# Patient Record
Sex: FEMALE | Race: BLACK OR AFRICAN AMERICAN | NOT HISPANIC OR LATINO | URBAN - METROPOLITAN AREA
[De-identification: names, ages, dates, MRNs, and addresses within clinical notes are randomized per-mention and may not be internally consistent; named-entity substitution may affect disease eponyms.]

---

## 2022-01-31 ENCOUNTER — EMERGENCY (EMERGENCY)
Facility: HOSPITAL | Age: 42
LOS: 1 days | Discharge: HOME | End: 2022-01-31
Attending: EMERGENCY MEDICINE | Admitting: EMERGENCY MEDICINE
Payer: COMMERCIAL

## 2022-01-31 VITALS
HEART RATE: 78 BPM | SYSTOLIC BLOOD PRESSURE: 116 MMHG | DIASTOLIC BLOOD PRESSURE: 75 MMHG | TEMPERATURE: 98 F | OXYGEN SATURATION: 100 % | RESPIRATION RATE: 18 BRPM

## 2022-01-31 PROCEDURE — 99283 EMERGENCY DEPT VISIT LOW MDM: CPT

## 2022-01-31 NOTE — ED ADULT TRIAGE NOTE - CHIEF COMPLAINT QUOTE
Pt reporting to the ED for nose injury. Pt reports shelf fell and hit pt on bridge of her nose. No LOC, anticoagulation use. No bleeding noted.

## 2022-02-01 RX ORDER — IBUPROFEN 200 MG
600 TABLET ORAL ONCE
Refills: 0 | Status: COMPLETED | OUTPATIENT
Start: 2022-02-01 | End: 2022-02-01

## 2022-02-01 RX ORDER — IBUPROFEN 200 MG
1 TABLET ORAL
Qty: 15 | Refills: 0
Start: 2022-02-01 | End: 2022-02-05

## 2022-02-01 RX ORDER — OXYCODONE HYDROCHLORIDE 5 MG/1
5 TABLET ORAL ONCE
Refills: 0 | Status: DISCONTINUED | OUTPATIENT
Start: 2022-02-01 | End: 2022-02-01

## 2022-02-01 RX ORDER — LIDOCAINE 4 G/100G
1 CREAM TOPICAL ONCE
Refills: 0 | Status: COMPLETED | OUTPATIENT
Start: 2022-02-01 | End: 2022-02-01

## 2022-02-01 RX ORDER — OXYCODONE HYDROCHLORIDE 5 MG/1
1 TABLET ORAL
Qty: 6 | Refills: 0
Start: 2022-02-01 | End: 2022-02-02

## 2022-02-01 RX ADMIN — Medication 600 MILLIGRAM(S): at 02:45

## 2022-02-01 RX ADMIN — LIDOCAINE 1 PATCH: 4 CREAM TOPICAL at 02:45

## 2022-02-01 RX ADMIN — OXYCODONE HYDROCHLORIDE 5 MILLIGRAM(S): 5 TABLET ORAL at 02:47

## 2022-02-01 NOTE — ED PROVIDER NOTE - ENMT, MLM
Airway patent, Nasal mucosa clear. Mouth with normal mucosa. Throat has no vesicles, no oropharyngeal exudates and uvula is midline. Breathing through bilateral nares. No septal hematoma. EOMI. Obvious deformity and swelling to bridge of nose.

## 2022-02-01 NOTE — ED PROVIDER NOTE - NSFOLLOWUPINSTRUCTIONS_ED_ALL_ED_FT
-You will be contacted by a hospital representative to help set up an appointment with an plastic surgeon during business hours. You may also use the patient access number above to set up your own appointment.    -Take Ibuprofen as prescribed. Take this medication with food.    -Take Oxycodone as prescribed. Do not drink alcohol or drive while taking this medication as it can make you drowsy. You may also need to take a stool softener or increase your fiber consumption as this medication can cause constipation.     -You may also take Extra Strength Tylenol over the counter per label instructions as needed for pain along with the above medications.    -Apply ice pack to nasal bridge 5-10 minutes up to 3 times per day to reduce swelling.

## 2022-02-01 NOTE — ED PROVIDER NOTE - ATTENDING CONTRIBUTION TO CARE
Afebrile. Awake and Alert. Head atraumatic. No mid-line CS TTP. +TTP upper nose no septal hematoma. Lungs CTA. Heart RRR. Abdomen soft NTND. CN II-XII grossly intact. Moves all extremities without lateralization.    f/u plastic within weak  Supportive care

## 2022-02-01 NOTE — ED PROVIDER NOTE - PATIENT PORTAL LINK FT
You can access the FollowMyHealth Patient Portal offered by [facility] by registering at the following website: [email or web address]. By joining [facility]’s FollowMyHealth portal, you will also be able to view your health information using other applications (apps) compatible with our system.

## 2022-02-01 NOTE — ED PROVIDER NOTE - CLINICAL SUMMARY MEDICAL DECISION MAKING FREE TEXT BOX
Gregory - traumatic injury to nose. no septal hematoma. + breathing through both nares. no signs of entrapment. No VA deficit. will pain control and dc with urgent ENT follow up for suspected nasal bone fracture.

## 2022-02-01 NOTE — ED PROVIDER NOTE - OBJECTIVE STATEMENT
41F no PMH presents to ED c/o pain to bridge of nose. Patient was working on shelf when it detached from the wall, striking her in the nose. She is having left sided neck pain as well. No headache. No back pain. No chest pain. No eye pain or blurry/double vision. No other injuries. Not on blood thinners.

## 2022-02-04 ENCOUNTER — LABORATORY RESULT (OUTPATIENT)
Age: 42
End: 2022-02-04

## 2022-02-04 ENCOUNTER — APPOINTMENT (OUTPATIENT)
Dept: OTOLARYNGOLOGY | Facility: CLINIC | Age: 42
End: 2022-02-04
Payer: COMMERCIAL

## 2022-02-04 VITALS
HEART RATE: 75 BPM | BODY MASS INDEX: 27.66 KG/M2 | TEMPERATURE: 97.2 F | WEIGHT: 162 LBS | DIASTOLIC BLOOD PRESSURE: 72 MMHG | HEIGHT: 64 IN | SYSTOLIC BLOOD PRESSURE: 110 MMHG

## 2022-02-04 DIAGNOSIS — Z82.49 FAMILY HISTORY OF ISCHEMIC HEART DISEASE AND OTHER DISEASES OF THE CIRCULATORY SYSTEM: ICD-10-CM

## 2022-02-04 DIAGNOSIS — Z83.3 FAMILY HISTORY OF DIABETES MELLITUS: ICD-10-CM

## 2022-02-04 DIAGNOSIS — R51.9 HEADACHE, UNSPECIFIED: ICD-10-CM

## 2022-02-04 DIAGNOSIS — Z86.69 PERSONAL HISTORY OF OTHER DISEASES OF THE NERVOUS SYSTEM AND SENSE ORGANS: ICD-10-CM

## 2022-02-04 DIAGNOSIS — Z78.9 OTHER SPECIFIED HEALTH STATUS: ICD-10-CM

## 2022-02-04 PROBLEM — Z00.00 ENCOUNTER FOR PREVENTIVE HEALTH EXAMINATION: Status: ACTIVE | Noted: 2022-02-04

## 2022-02-04 PROCEDURE — 99204 OFFICE O/P NEW MOD 45 MIN: CPT

## 2022-02-04 RX ORDER — SUMATRIPTAN 50 MG/1
TABLET, FILM COATED ORAL
Refills: 0 | Status: ACTIVE | COMMUNITY

## 2022-02-05 ENCOUNTER — OUTPATIENT (OUTPATIENT)
Dept: OUTPATIENT SERVICES | Facility: HOSPITAL | Age: 42
LOS: 1 days | End: 2022-02-05
Payer: COMMERCIAL

## 2022-02-05 ENCOUNTER — APPOINTMENT (OUTPATIENT)
Dept: CT IMAGING | Facility: HOSPITAL | Age: 42
End: 2022-02-05

## 2022-02-05 PROCEDURE — 70486 CT MAXILLOFACIAL W/O DYE: CPT

## 2022-02-05 PROCEDURE — 70486 CT MAXILLOFACIAL W/O DYE: CPT | Mod: 26

## 2022-02-07 RX ORDER — IBUPROFEN 600 MG/1
600 TABLET, FILM COATED ORAL
Qty: 15 | Refills: 0 | Status: ACTIVE | COMMUNITY
Start: 2022-02-01

## 2022-02-08 ENCOUNTER — APPOINTMENT (OUTPATIENT)
Dept: OTOLARYNGOLOGY | Facility: HOSPITAL | Age: 42
End: 2022-02-08

## 2022-02-08 ENCOUNTER — OUTPATIENT (OUTPATIENT)
Dept: OUTPATIENT SERVICES | Facility: HOSPITAL | Age: 42
LOS: 1 days | Discharge: HOME | End: 2022-02-08
Payer: COMMERCIAL

## 2022-02-08 LAB
BASOPHILS # BLD AUTO: 0.02 K/UL
BASOPHILS NFR BLD AUTO: 0.3 %
EOSINOPHIL # BLD AUTO: 0.04 K/UL
EOSINOPHIL NFR BLD AUTO: 0.7 %
HCT VFR BLD CALC: 37.7 %
HGB BLD-MCNC: 12.1 G/DL
IMM GRANULOCYTES NFR BLD AUTO: 0.3 %
LYMPHOCYTES # BLD AUTO: 2.56 K/UL
LYMPHOCYTES NFR BLD AUTO: 41.8 %
MAN DIFF?: NORMAL
MCHC RBC-ENTMCNC: 29.9 PG
MCHC RBC-ENTMCNC: 32.1 GM/DL
MCV RBC AUTO: 93.1 FL
MONOCYTES # BLD AUTO: 0.7 K/UL
MONOCYTES NFR BLD AUTO: 11.4 %
NEUTROPHILS # BLD AUTO: 2.78 K/UL
NEUTROPHILS NFR BLD AUTO: 45.5 %
PLATELET # BLD AUTO: 333 K/UL
RBC # BLD: 4.05 M/UL
RBC # FLD: 13.6 %
WBC # FLD AUTO: 6.12 K/UL

## 2022-02-08 PROCEDURE — 21320 CLSD TX NSL FX W/MNPJ&STABLJ: CPT | Mod: GC

## 2022-02-08 NOTE — ASSESSMENT
[FreeTextEntry1] : Ms. RIOS is a 41 year old woman with acute nasal fracture and deformity due to blunt trauma on evening of 1/31/2022.  On exam, there is a separation of bone between midline and right side, with depression of medial segment, without a fracture in contralateral segment.  The nasal bone is in midline.  The skin is intact.  There is no septal hematoma.  She has headache since the trauma.\par It appears that there has been a direct blow to the depressed segment of nasal bone.\par \par I recommend closed reduction of nasal bone fracture with stabilization, which could be done under local anesthesia in office or under general anesthesia in the OR.   I think that considerable force will have to be generated to elevate the medial segment of bone and reduce the fracture.  \par Risks include, but are not limited to, bleeding, infection, intranasal scarring, difficulty breathing,and need for further treatment.\par Her questions were answered, and she agreed to proceed with surgery in OR.\par The closed reduction of nasal fracture with stabilization has been scheduled for February 8, 2022, at [facility].\par \par CT maxillofacial noncontrast to be done to evaluate extent of fracture and r/o other facial fractures from the trauma.\par \par

## 2022-02-08 NOTE — PHYSICAL EXAM
[Midline] : trachea located in midline position [FreeTextEntry1] : No hoarseness.  [de-identified] : Right nasal bone step-off is abrupt, with medial segment depression; no contralateral bony elevation.  Nasal bones in midline. [de-identified] : no septal hematoma [Normal] : palatine tonsils are normal [de-identified] : 2+ [FreeTextEntry2] : see NOSE.  No ecchymoses on face.  No other bony stepoff/deformities on face. [de-identified] : Carotid pulses 2+ bilateral.

## 2022-02-08 NOTE — CONSULT LETTER
[Dear  ___] : Dear  [unfilled], [Courtesy Letter:] : I had the pleasure of seeing your patient, [unfilled], in my office today. [Please see my note below.] : Please see my note below. [Sincerely,] : Sincerely, [FreeTextEntry2] : Shad Jones MD\par [address]\[place] [address] [FreeTextEntry3] : \par Ofe Jenkins MD \par Otolaryngology, Head and Neck Surgery \par \par

## 2022-02-08 NOTE — HISTORY OF PRESENT ILLNESS
[de-identified] : Ms. RIOS is a 41 year old woman who was referred by [facility] ED for suspected nasal bone fracture.\par PMH: Migraine\par PCP:  Shad Jones MD\par \par On 1/31/2022, while repairing a shelf, a part of the shelf detached from wall and fell onto her nose. Bleeding from both nostrils for about 10 minutes. She presented that day to [facility] ED with pain on bridge of nose and left-sided neck pain.  No injury to the skin on face/nose.  No imaging done.  \par Today, she is still having pain on the nasal bridge. She has a headache between her eyes and above her right eye. \par Yesterday she felt like her nostrils were closing up and she felt faint. She feels like her breathing is blocked on the right, and breathing is different than prior to the incident.  Since the accident she feels a deformity of right external nose and thinks nose looks different.  Now has runny nose off and on. She has been icing her nose since the accident.\par No blurry/double vision. No other injuries.   No prior nasal trauma.\par \par

## 2022-02-08 NOTE — REVIEW OF SYSTEMS
[Patient Intake Form Reviewed] : Patient intake form was reviewed [As Noted in HPI] : as noted in HPI [Negative] : Heme/Lymph [de-identified] : headache, migraine

## 2022-02-08 NOTE — REVIEW OF SYSTEMS
[Patient Intake Form Reviewed] : Patient intake form was reviewed [As Noted in HPI] : as noted in HPI [Negative] : Heme/Lymph [de-identified] : headache, migraine

## 2022-02-08 NOTE — PHYSICAL EXAM
[Midline] : trachea located in midline position [FreeTextEntry1] : No hoarseness.  [de-identified] : Right nasal bone step-off is abrupt, with medial segment depression; no contralateral bony elevation.  Nasal bones in midline. [de-identified] : no septal hematoma [Normal] : palatine tonsils are normal [de-identified] : 2+ [FreeTextEntry2] : see NOSE.  No ecchymoses on face.  No other bony stepoff/deformities on face. [de-identified] : Carotid pulses 2+ bilateral.

## 2022-02-08 NOTE — ASSESSMENT
[FreeTextEntry1] : Ms. RIOS is a 41 year old woman with acute nasal fracture and deformity due to blunt trauma on evening of 1/31/2022.  On exam, there is a separation of bone between midline and right side, with depression of medial segment, without a fracture in contralateral segment.  The nasal bone is in midline.  The skin is intact.  There is no septal hematoma.  She has headache since the trauma.\par It appears that there has been a direct blow to the depressed segment of nasal bone.\par \par I recommend closed reduction of nasal bone fracture with stabilization, which could be done under local anesthesia in office or under general anesthesia in the OR.   I think that considerable force will have to be generated to elevate the medial segment of bone and reduce the fracture.  \par Risks include, but are not limited to, bleeding, infection, intranasal scarring, difficulty breathing,and need for further treatment.\par Her questions were answered, and she agreed to proceed with surgery in OR.\par The closed reduction of nasal fracture with stabilization has been scheduled for February 8, 2022, at [facility].\par \par CT maxillofacial noncontrast to be done to evaluate extent of fracture and r/o other facial fractures from the trauma.\par \par

## 2022-02-08 NOTE — CONSULT LETTER
[Dear  ___] : Dear  [unfilled], [Courtesy Letter:] : I had the pleasure of seeing your patient, [unfilled], in my office today. [Please see my note below.] : Please see my note below. [Sincerely,] : Sincerely, [FreeTextEntry2] : Shad Jones MD\par [address]\[place] [address] [FreeTextEntry3] : \par Ofe Jenkins MD \par Otolaryngology, Head and Neck Surgery \par \par

## 2022-02-08 NOTE — HISTORY OF PRESENT ILLNESS
[de-identified] : Ms. RIOS is a 41 year old woman who was referred by [facility] ED for suspected nasal bone fracture.\par PMH: Migraine\par PCP:  Shad Jones MD\par \par On 1/31/2022, while repairing a shelf, a part of the shelf detached from wall and fell onto her nose. Bleeding from both nostrils for about 10 minutes. She presented that day to [facility] ED with pain on bridge of nose and left-sided neck pain.  No injury to the skin on face/nose.  No imaging done.  \par Today, she is still having pain on the nasal bridge. She has a headache between her eyes and above her right eye. \par Yesterday she felt like her nostrils were closing up and she felt faint. She feels like her breathing is blocked on the right, and breathing is different than prior to the incident.  Since the accident she feels a deformity of right external nose and thinks nose looks different.  Now has runny nose off and on. She has been icing her nose since the accident.\par No blurry/double vision. No other injuries.   No prior nasal trauma.\par \par

## 2022-02-10 ENCOUNTER — NON-APPOINTMENT (OUTPATIENT)
Age: 42
End: 2022-02-10

## 2022-02-10 ENCOUNTER — APPOINTMENT (OUTPATIENT)
Dept: OTOLARYNGOLOGY | Facility: CLINIC | Age: 42
End: 2022-02-10
Payer: COMMERCIAL

## 2022-02-10 VITALS
HEART RATE: 80 BPM | WEIGHT: 161 LBS | DIASTOLIC BLOOD PRESSURE: 74 MMHG | BODY MASS INDEX: 27.49 KG/M2 | SYSTOLIC BLOOD PRESSURE: 122 MMHG | HEIGHT: 64 IN | TEMPERATURE: 96.9 F

## 2022-02-10 DIAGNOSIS — S02.2XXA FRACTURE OF NASAL BONES, INITIAL ENCOUNTER FOR CLOSED FRACTURE: ICD-10-CM

## 2022-02-10 DIAGNOSIS — S09.93XA UNSPECIFIED INJURY OF FACE, INITIAL ENCOUNTER: ICD-10-CM

## 2022-02-10 DIAGNOSIS — S09.92XA UNSPECIFIED INJURY OF NOSE, INITIAL ENCOUNTER: ICD-10-CM

## 2022-02-10 PROCEDURE — 99024 POSTOP FOLLOW-UP VISIT: CPT

## 2022-02-10 RX ORDER — POLYVINYL ALCOHOL 14 MG/ML
1.4 SOLUTION/ DROPS OPHTHALMIC
Refills: 0 | Status: ACTIVE | COMMUNITY

## 2022-02-10 NOTE — CONSULT LETTER
[Dear  ___] : Dear  [unfilled], [Courtesy Letter:] : I had the pleasure of seeing your patient, [unfilled], in my office today. [Please see my note below.] : Please see my note below. [Sincerely,] : Sincerely, [FreeTextEntry2] : Shad Jones MD\par [address]\[place] [address] [FreeTextEntry3] : \par Ofe Jenkins MD \par Otolaryngology, Head and Neck Surgery \par \par

## 2022-02-10 NOTE — ASSESSMENT
[FreeTextEntry1] : Ms. George is recovering from closed reduction of nasal fracture with stabilization on 02/08/2022.\par She can breathe well now that the dissolvable packing has been removed from right nasal cavity.\par The right eyelid and paranasal edema decreased after the ice packs.\par Right eye vision seems fine, and she reports no eye pain, just sensitivity when viewing computer screen.\par \par Return for cast removal on 02/15.\par I will f/u with Ophtho re her appt time for 02/11.\par She is cleared to travel to [place] in 2 weeks for a marathon re her nasal fracture and surgery.\par \par

## 2022-02-10 NOTE — HISTORY OF PRESENT ILLNESS
[de-identified] : Ms. George underwent closed reduction of nasal fracture with stabilization on 02/08/2022 at [facility].\par The right depressed bone fragments were reduced, with satisfactory results in OR. Surgical fibrillar was placed in right nasal roof.  Steri strips and Aquaplast cast placed on nasal dorsum.\par During the application of nasal dressing, some Mastisol dripped into right eye, which was immediately irrigated with BSS.  Some adhesive on eyelashes.  She was evaluated by Ophtho, who diagnosed mild corneal edema and placed on erythromycin ointment TID and artificial tears.  No vision change but some eye irritation in PACU.  Ophtho appt for 02/11.\par When I spoke to Ms. George yesterday in late afternoon, she reported that swelling around right nose and lower eyelid was decreased after ice packs at home.  Vision was fine.  However, she reported acute right nasal obstruction, so I advised her to come to office today.  Likely Surgicel has fallen towards floor of nose.\par \par Today, she reports that right nostril is still clogged.\par No pain or vision change in right eye but has sensitivity when looking at computer screen.\par Has not heard yet re time for 2/11 eye appt.\par

## 2022-02-10 NOTE — PHYSICAL EXAM
[de-identified] : Cast and Steri strips remain in place. [de-identified] : Surgicel fibrillar removed from right nasal cavity. [Normal] : extraocular movements are normal [de-identified] : No asymmetry of eye lids noted today.

## 2022-02-11 ENCOUNTER — APPOINTMENT (OUTPATIENT)
Dept: OPHTHALMOLOGY | Facility: CLINIC | Age: 42
End: 2022-02-11
Payer: COMMERCIAL

## 2022-02-11 ENCOUNTER — NON-APPOINTMENT (OUTPATIENT)
Age: 42
End: 2022-02-11

## 2022-02-11 PROCEDURE — 92002 INTRM OPH EXAM NEW PATIENT: CPT

## 2022-02-15 ENCOUNTER — APPOINTMENT (OUTPATIENT)
Dept: OTOLARYNGOLOGY | Facility: CLINIC | Age: 42
End: 2022-02-15
Payer: COMMERCIAL

## 2022-02-15 VITALS
HEART RATE: 71 BPM | WEIGHT: 164 LBS | DIASTOLIC BLOOD PRESSURE: 71 MMHG | TEMPERATURE: 97.9 F | HEIGHT: 64 IN | BODY MASS INDEX: 28 KG/M2 | SYSTOLIC BLOOD PRESSURE: 100 MMHG

## 2022-02-15 DIAGNOSIS — H10.211 ACUTE TOXIC CONJUNCTIVITIS, RIGHT EYE: ICD-10-CM

## 2022-02-15 DIAGNOSIS — S02.2XXD FRACTURE OF NASAL BONES, SUBSEQUENT ENCOUNTER FOR FRACTURE WITH ROUTINE HEALING: ICD-10-CM

## 2022-02-15 DIAGNOSIS — H57.89 OTHER SPECIFIED DISORDERS OF EYE AND ADNEXA: ICD-10-CM

## 2022-02-15 PROCEDURE — 99212 OFFICE O/P EST SF 10 MIN: CPT

## 2022-02-15 RX ORDER — OXYCODONE 5 MG/1
5 TABLET ORAL
Qty: 6 | Refills: 0 | Status: COMPLETED | COMMUNITY
Start: 2022-02-01 | End: 2022-02-15

## 2022-02-15 RX ORDER — ERYTHROMYCIN 5 MG/G
5 OINTMENT OPHTHALMIC
Refills: 0 | Status: COMPLETED | COMMUNITY
End: 2022-02-14

## 2022-02-15 NOTE — HISTORY OF PRESENT ILLNESS
[de-identified] : Ms. George is s/p closed reduction of nasal fracture with stabilization on 02/08/2022 at [place].\par She had a headache between her eyes on Friday night (2/11) which went away after Tylenol. No other pain. \par She had some clear runny nose since the surgery but less over time. No nose bleeding. \par Breathing through her nose is better.  Using saline nasal spray.\par Vision is normal.  On 2/11, she saw the optometrist who diagnosed 20/20 vision bilateral, with resolving right chemical conjunctivitis.  Advised 3 more days of erythromycin gel and artificial tears.\par

## 2022-02-15 NOTE — ASSESSMENT
[FreeTextEntry1] : Ms. George is healing well after closed reduction of nasal fracture with stabilization on 02/08/2022.\par She can breathe well and is happy with the repair of nasal fracture.\par The nasal drainage is expected to resolve in another week.  \par --> continue saline nasal spray PRN\par --> light massage to nasal sidewalls to reduce edema faster\par \par The right chemical conjunctivitis has resolved.  Vision is 20/20 bilateral.\par --> artificial tears PRN, storm during air travel.\par \par She is still cleared to travel to [place] in 2 weeks to support her friend who is running a marathon.\par She will return to in-person work in 2 weeks, to avoid possible injury on her usual public transportation.\par \par I would be happy to see her again as needed. \par \par

## 2022-02-15 NOTE — CONSULT LETTER
[Dear  ___] : Dear  [unfilled], [Courtesy Letter:] : I had the pleasure of seeing your patient, [unfilled], in my office today. [Please see my note below.] : Please see my note below. [Sincerely,] : Sincerely, [FreeTextEntry2] : Shad Jones MD\par [address]\[place] [address] [FreeTextEntry3] : \par Ofe Jenkins MD \par Otolaryngology, Head and Neck Surgery \par \par

## 2022-02-15 NOTE — PHYSICAL EXAM
[de-identified] : Nasal cast and Steri strips removed.  Nasal bones appear straight; satisfactory reduction of right nasal fracture.  Mild soft tissue edema right side nose. [Normal] : ocular motility and gaze are normal [de-identified] : No redness right eye.

## 2022-02-16 ENCOUNTER — APPOINTMENT (OUTPATIENT)
Dept: OTOLARYNGOLOGY | Facility: CLINIC | Age: 42
End: 2022-02-16

## (undated) DEVICE — SLV COMPRESSION KNEE MED

## (undated) DEVICE — DRSG TELFA 3 X 8

## (undated) DEVICE — PETRI DISH MED 3.5"

## (undated) DEVICE — GLV 7.5 DERMAPRENE ULTRA

## (undated) DEVICE — ELCTR COLORADO 3CM

## (undated) DEVICE — PACK RHINOPLASTY

## (undated) DEVICE — ELCTR BOVIE SUCTION 8FR 6"

## (undated) DEVICE — DRSG MASTISOL

## (undated) DEVICE — GLV 6.5 PROTEXIS (WHITE)

## (undated) DEVICE — WARMING BLANKET LOWER ADULT

## (undated) DEVICE — PACKING 1/2"